# Patient Record
(demographics unavailable — no encounter records)

---

## 2025-05-21 NOTE — PHYSICAL EXAM
[Alert] : alert [Oriented x 3] : ~L oriented x 3 [FreeTextEntry3] : brown and pink papules on the face, chest, back, shoulders

## 2025-05-21 NOTE — ASSESSMENT
[FreeTextEntry1] : 1) Acne vulgaris, chronic, flaring - Failed Differin gel X 3 months  - Reviewed risks (as well as mitigation strategies for adverse drug events as applicable), benefits, and alternatives of therapy - Cont clindamycin 1% lotion to AA QAM, SED - continue Retin-A 0.025% cream to AA on face qhs, SED. Use every 2-3 days initially and then titrate up to once nightly as tolerated - Cont BPO 5% wash to AA daily, SED - START hibiclens wash for body -start doxycycline 100mg BID x 2 months, SED- take with food/water, do not lie down for 30 mins after taking, use photoprotection. - discussed isotretinoin - not interested at this time  2) PIH - 2/2 underlying inflammatory condition. Prevention/treating inflammation can help prevent this complication - Recommended OTC sunscreen SPF 30 or higher use regularly   RTC 2 months

## 2025-05-21 NOTE — HISTORY OF PRESENT ILLNESS
[FreeTextEntry1] : acne-RPA [de-identified] : 11yo F presents for f/up here with dad, mom is on the phone [facility] 6/2024 by Dr. Garcia  # Acne Location: face, chest, back using clindamycin lotion and tretinoin 0.05% cream Oral treatments attempted: none.   also prescribed hydroquinone at   not using consistently   plays tennis and other spots year round